# Patient Record
Sex: FEMALE | URBAN - METROPOLITAN AREA
[De-identification: names, ages, dates, MRNs, and addresses within clinical notes are randomized per-mention and may not be internally consistent; named-entity substitution may affect disease eponyms.]

---

## 2023-12-04 ENCOUNTER — OFFICE (OUTPATIENT)
Dept: URBAN - METROPOLITAN AREA CLINIC 8 | Facility: CLINIC | Age: 73
Setting detail: OPHTHALMOLOGY
End: 2023-12-04

## 2023-12-04 DIAGNOSIS — H90.3: ICD-10-CM

## 2023-12-04 PROCEDURE — 92593 HEARING AID CHECK; BINAURAL: CPT | Performed by: AUDIOLOGIST

## 2023-12-13 ENCOUNTER — OFFICE (OUTPATIENT)
Dept: URBAN - METROPOLITAN AREA CLINIC 8 | Facility: CLINIC | Age: 73
Setting detail: OPHTHALMOLOGY
End: 2023-12-13

## 2023-12-13 DIAGNOSIS — H90.3: ICD-10-CM

## 2023-12-13 PROCEDURE — HAD HEARING AID DISPENSE: Performed by: AUDIOLOGIST

## 2024-01-03 ENCOUNTER — OFFICE (OUTPATIENT)
Dept: URBAN - METROPOLITAN AREA CLINIC 8 | Facility: CLINIC | Age: 74
Setting detail: OPHTHALMOLOGY
End: 2024-01-03

## 2024-01-03 DIAGNOSIS — H90.3: ICD-10-CM

## 2024-01-03 PROCEDURE — 92593 HEARING AID CHECK; BINAURAL: CPT | Performed by: AUDIOLOGIST

## 2024-01-24 ENCOUNTER — OFFICE (OUTPATIENT)
Dept: URBAN - METROPOLITAN AREA CLINIC 8 | Facility: CLINIC | Age: 74
Setting detail: OPHTHALMOLOGY
End: 2024-01-24

## 2024-01-24 DIAGNOSIS — H90.3: ICD-10-CM

## 2024-01-24 PROCEDURE — 92593 HEARING AID CHECK; BINAURAL: CPT | Performed by: AUDIOLOGIST

## 2024-03-22 ENCOUNTER — APPOINTMENT (OUTPATIENT)
Dept: ORTHOPEDIC SURGERY | Facility: CLINIC | Age: 74
End: 2024-03-22
Payer: MEDICARE

## 2024-03-22 ENCOUNTER — TRANSCRIPTION ENCOUNTER (OUTPATIENT)
Age: 74
End: 2024-03-22

## 2024-03-22 PROBLEM — Z00.00 ENCOUNTER FOR PREVENTIVE HEALTH EXAMINATION: Status: ACTIVE | Noted: 2024-03-22

## 2024-03-22 PROCEDURE — 20610 DRAIN/INJ JOINT/BURSA W/O US: CPT | Mod: LT

## 2024-03-22 PROCEDURE — 99204 OFFICE O/P NEW MOD 45 MIN: CPT | Mod: 25

## 2024-03-22 PROCEDURE — 73562 X-RAY EXAM OF KNEE 3: CPT | Mod: LT

## 2024-03-22 RX ORDER — AMLODIPINE BESYLATE 10 MG/1
10 TABLET ORAL
Refills: 0 | Status: ACTIVE | COMMUNITY

## 2024-03-22 RX ORDER — LEVOTHYROXINE SODIUM 300 UG/1
TABLET ORAL
Refills: 0 | Status: ACTIVE | COMMUNITY

## 2024-03-22 NOTE — HISTORY OF PRESENT ILLNESS
[de-identified] :  Patient presents for evaluation on LT knee pain. She reports difficulty with getting in and out of cars and descending stairs since January.

## 2024-03-22 NOTE — PHYSICAL EXAM
[4___] : hamstring 4[unfilled]/5 [] : patient ambulates without assistive device [Left] : left knee [AP] : anteroposterior [[place]] : skyline [Lateral] : lateral [advanced tricompartmental OA with medial compartment narrowing and varus alignment] : advanced tricompartmental OA with medial compartment narrowing and varus alignment [[id number]] : flexion 110 degrees [de-identified] : extension 10 degrees

## 2024-03-22 NOTE — PROCEDURE
[Left] : of the left [Knee] : knee [Pain] : pain [Inflammation] : inflammation [X-ray evidence of Osteoarthritis on this or prior visit] : x-ray evidence of Osteoarthritis on this or prior visit [Alcohol] : alcohol [Betadine] : betadine [Ethyl Chloride sprayed topically] : ethyl chloride sprayed topically [___ cc    3mg] :  Betamethasone ([place]) ~Vcc of 3mg [___ cc    1%] : Lidocaine ~Vcc of 1%  [] : Patient tolerated procedure well [Call if redness, pain or fever occur] : call if redness, pain or fever occur [Apply ice for 15min out of every hour for the next 12-24 hours as tolerated] : apply ice for 15 minutes out of every hour for the next 12-24 hours as tolerated [Patient was advised to rest the joint(s) for ____ days] : patient was advised to rest the joint(s) for [unfilled] days [Previous OTC use and PT nontherapeutic] : patient has tried OTC's including aspirin, Ibuprofen, Aleve, etc or prescription NSAIDS, and/or exercises at home and/or physical therapy without satisfactory response [Patient had decreased mobility in the joint] : patient had decreased mobility in the joint [Risks, benefits, alternatives discussed / Verbal consent obtained] : the risks benefits, and alternatives have been discussed, and verbal consent was obtained [Large Joint Injection] : Large joint injection

## 2024-03-22 NOTE — DISCUSSION/SUMMARY
[de-identified] : I reviewed patient's imaging and discussed her condition and treatment options with patient and her .  Nonoperative treatments include weight loss, physical therapy, NSAIDs, home exercise program, and injections.  Operative treatments include geniculate nerve ablation, knee arthroscopy, and total knee arthroplasty.  She tolerated corticosteroid injection today.  Follow up in 3 weeks to consider viscosupplementation.  Patient voiced understanding and agreement with the plan.

## 2024-04-12 ENCOUNTER — APPOINTMENT (OUTPATIENT)
Dept: ORTHOPEDIC SURGERY | Facility: CLINIC | Age: 74
End: 2024-04-12
Payer: MEDICARE

## 2024-04-12 PROCEDURE — 99213 OFFICE O/P EST LOW 20 MIN: CPT

## 2024-04-12 NOTE — PHYSICAL EXAM
[4___] : hamstring 4[unfilled]/5 [] : patient ambulates without assistive device [Left] : left knee [AP] : anteroposterior [Lateral] : lateral [[place]] : skyline [advanced tricompartmental OA with medial compartment narrowing and varus alignment] : advanced tricompartmental OA with medial compartment narrowing and varus alignment [[id number]] : flexion 110 degrees [de-identified] : extension 10 degrees

## 2024-04-12 NOTE — DISCUSSION/SUMMARY
[de-identified] : I reviewed patient's prior imaging and discussed her condition and treatment options.  Plan to start PT and elected to proceed with viscosupplementation.  Follow up next week for first injection.

## 2024-04-19 ENCOUNTER — APPOINTMENT (OUTPATIENT)
Dept: ORTHOPEDIC SURGERY | Facility: CLINIC | Age: 74
End: 2024-04-19
Payer: MEDICARE

## 2024-04-19 PROCEDURE — 20610 DRAIN/INJ JOINT/BURSA W/O US: CPT | Mod: LT

## 2024-04-26 ENCOUNTER — APPOINTMENT (OUTPATIENT)
Dept: ORTHOPEDIC SURGERY | Facility: CLINIC | Age: 74
End: 2024-04-26
Payer: MEDICARE

## 2024-04-26 PROCEDURE — 20610 DRAIN/INJ JOINT/BURSA W/O US: CPT | Mod: LT

## 2024-04-26 NOTE — PROCEDURE
[Large Joint Injection] : Large joint injection [Left] : of the left [Knee] : knee [Pain] : pain [Inflammation] : inflammation [X-ray evidence of Osteoarthritis on this or prior visit] : x-ray evidence of Osteoarthritis on this or prior visit [Alcohol] : alcohol [Betadine] : betadine [Ethyl Chloride sprayed topically] : ethyl chloride sprayed topically [Orthovisc (30mg)] : 30mg of Orthovisc [#2] : series #2 [] : Patient tolerated procedure well [Call if redness, pain or fever occur] : call if redness, pain or fever occur [Apply ice for 15min out of every hour for the next 12-24 hours as tolerated] : apply ice for 15 minutes out of every hour for the next 12-24 hours as tolerated [Patient was advised to rest the joint(s) for ____ days] : patient was advised to rest the joint(s) for [unfilled] days [Previous OTC use and PT nontherapeutic] : patient has tried OTC's including aspirin, Ibuprofen, Aleve, etc or prescription NSAIDS, and/or exercises at home and/or physical therapy without satisfactory response [Patient had decreased mobility in the joint] : patient had decreased mobility in the joint [Risks, benefits, alternatives discussed / Verbal consent obtained] : the risks benefits, and alternatives have been discussed, and verbal consent was obtained

## 2024-05-03 ENCOUNTER — APPOINTMENT (OUTPATIENT)
Dept: ORTHOPEDIC SURGERY | Facility: CLINIC | Age: 74
End: 2024-05-03
Payer: MEDICARE

## 2024-05-03 DIAGNOSIS — M17.12 UNILATERAL PRIMARY OSTEOARTHRITIS, LEFT KNEE: ICD-10-CM

## 2024-05-03 PROCEDURE — 20610 DRAIN/INJ JOINT/BURSA W/O US: CPT | Mod: LT

## 2024-05-03 NOTE — PROCEDURE
[Large Joint Injection] : Large joint injection [Left] : of the left [Knee] : knee [Pain] : pain [Inflammation] : inflammation [X-ray evidence of Osteoarthritis on this or prior visit] : x-ray evidence of Osteoarthritis on this or prior visit [Alcohol] : alcohol [Betadine] : betadine [Ethyl Chloride sprayed topically] : ethyl chloride sprayed topically [Orthovisc (30mg)] : 30mg of Orthovisc [#3] : series #3 [] : Patient tolerated procedure well [Call if redness, pain or fever occur] : call if redness, pain or fever occur [Apply ice for 15min out of every hour for the next 12-24 hours as tolerated] : apply ice for 15 minutes out of every hour for the next 12-24 hours as tolerated [Patient was advised to rest the joint(s) for ____ days] : patient was advised to rest the joint(s) for [unfilled] days [Previous OTC use and PT nontherapeutic] : patient has tried OTC's including aspirin, Ibuprofen, Aleve, etc or prescription NSAIDS, and/or exercises at home and/or physical therapy without satisfactory response [Patient had decreased mobility in the joint] : patient had decreased mobility in the joint [Risks, benefits, alternatives discussed / Verbal consent obtained] : the risks benefits, and alternatives have been discussed, and verbal consent was obtained [Sterile technique used] : sterile technique used

## 2024-07-15 ENCOUNTER — OFFICE (OUTPATIENT)
Dept: URBAN - METROPOLITAN AREA CLINIC 8 | Facility: CLINIC | Age: 74
Setting detail: OPHTHALMOLOGY
End: 2024-07-15

## 2024-07-15 DIAGNOSIS — H90.3: ICD-10-CM

## 2024-07-15 PROCEDURE — 92593 HEARING AID CHECK; BINAURAL: CPT | Performed by: AUDIOLOGIST

## 2024-12-04 ENCOUNTER — OFFICE (OUTPATIENT)
Dept: URBAN - METROPOLITAN AREA CLINIC 8 | Facility: CLINIC | Age: 74
Setting detail: OPHTHALMOLOGY
End: 2024-12-04
Payer: MEDICARE

## 2024-12-04 DIAGNOSIS — H90.3: ICD-10-CM

## 2024-12-04 PROCEDURE — 92550 TYMPANOMETRY & REFLEX THRESH: CPT | Performed by: AUDIOLOGIST

## 2024-12-04 PROCEDURE — 92557 COMPREHENSIVE HEARING TEST: CPT | Performed by: AUDIOLOGIST

## 2025-01-17 ENCOUNTER — APPOINTMENT (OUTPATIENT)
Dept: ORTHOPEDIC SURGERY | Facility: CLINIC | Age: 75
End: 2025-01-17
Payer: MEDICARE

## 2025-01-17 DIAGNOSIS — M17.12 UNILATERAL PRIMARY OSTEOARTHRITIS, LEFT KNEE: ICD-10-CM

## 2025-01-17 PROCEDURE — 99213 OFFICE O/P EST LOW 20 MIN: CPT

## 2025-02-28 ENCOUNTER — APPOINTMENT (OUTPATIENT)
Dept: ORTHOPEDIC SURGERY | Facility: CLINIC | Age: 75
End: 2025-02-28